# Patient Record
Sex: FEMALE | Race: BLACK OR AFRICAN AMERICAN | NOT HISPANIC OR LATINO | Employment: FULL TIME | URBAN - METROPOLITAN AREA
[De-identification: names, ages, dates, MRNs, and addresses within clinical notes are randomized per-mention and may not be internally consistent; named-entity substitution may affect disease eponyms.]

---

## 2022-06-30 ENCOUNTER — OFFICE VISIT (OUTPATIENT)
Dept: OBSTETRICS AND GYNECOLOGY | Facility: CLINIC | Age: 22
End: 2022-06-30

## 2022-06-30 VITALS
DIASTOLIC BLOOD PRESSURE: 60 MMHG | SYSTOLIC BLOOD PRESSURE: 100 MMHG | BODY MASS INDEX: 21.4 KG/M2 | WEIGHT: 120.8 LBS | HEIGHT: 63 IN

## 2022-06-30 DIAGNOSIS — T19.2XXA RETAINED TAMPON NOT FOUND ON EXAMINATION: Primary | ICD-10-CM

## 2022-06-30 PROCEDURE — 99203 OFFICE O/P NEW LOW 30 MIN: CPT | Performed by: OBSTETRICS & GYNECOLOGY

## 2022-06-30 RX ORDER — FLUOXETINE HYDROCHLORIDE 20 MG/1
20 CAPSULE ORAL EVERY MORNING
COMMUNITY
Start: 2022-04-01

## 2022-06-30 NOTE — PROGRESS NOTES
"        REASON FOR FOLLOWUP/CHIEF COMPLAINT: Concern for possible retained tampon     HISTORY OF PRESENT ILLNESS: Patient comes with last menstrual period of 6/18/2022.  She started to feel that she had left a tampon in and got concerned and made an appointment.  She denies any abnormal discharge or pain or vaginal odor.  She has regular menses and has no other concerns today does not want to start oral contraceptive.      Past Medical History, Past Surgical History, Social History, Family History have been reviewed and are without significant changes except as mentioned.    Review of Systems   Review of Systems   Constitutional: Negative for fatigue and fever.   Respiratory: Negative for cough and shortness of breath.    Genitourinary: Negative for pelvic pain, vaginal bleeding and vaginal discharge.       Medications:  The current medication list was reviewed in the EMR    ALLERGIES:  No Known Allergies           Vitals:    06/30/22 1117   BP: 100/60   Weight: 54.8 kg (120 lb 12.8 oz)   Height: 160 cm (63\")     Physical Exam    CONSTITUTIONAL:  Vital signs reviewed.  No distress, looks comfortable.    GASTROINTESTINAL: Abdomen appears unremarkable.  Nontender.  No hepatomegaly.  No splenomegaly.    PELVIC: Normal external genitalia with no discharge seen at the introitus.  Normal speculum exam anterior and posterior fornices are clear of any abnormal discharge or tampon.  Bimanual exam confirms the above findings and no adnexal masses or tenderness or tampon felt.  .  PSYCHIATRIC:  Normal judgment and insight.  Normal mood and affect.       RECENT LABS:No results found for: WBC, HGB, PLT    ASSESSMENT/PLAN:  [facility] Room/bed info not found   1.  Concern for retained tampon, not found on exam.  Patient reassured and will follow-up as needed I told her that Pap smears normally would begin around age 23.  "